# Patient Record
Sex: FEMALE | Race: WHITE | NOT HISPANIC OR LATINO | Employment: OTHER | URBAN - METROPOLITAN AREA
[De-identification: names, ages, dates, MRNs, and addresses within clinical notes are randomized per-mention and may not be internally consistent; named-entity substitution may affect disease eponyms.]

---

## 2022-02-28 ENCOUNTER — CONSULTATION/EVALUATION (OUTPATIENT)
Dept: URBAN - METROPOLITAN AREA CLINIC 35 | Facility: CLINIC | Age: 67
End: 2022-02-28

## 2022-02-28 DIAGNOSIS — H25.812: ICD-10-CM

## 2022-02-28 DIAGNOSIS — H25.811: ICD-10-CM

## 2022-02-28 PROCEDURE — 92004 COMPRE OPH EXAM NEW PT 1/>: CPT

## 2022-02-28 PROCEDURE — 92136TC INTERFEROMETRY - TECHNICAL COMPONENT

## 2022-02-28 PROCEDURE — 92025CV CORNEAL TOPOGRAPHY, CV

## 2022-02-28 PROCEDURE — V2799PMN IMPRIMIS PRED-MOXI-NEPAF 5ML

## 2022-02-28 ASSESSMENT — VISUAL ACUITY
OS_SC: J1
OS_CC: 20/25
OS_SC: 20/400
OD_SC: J2
OD_CC: 20/25
OD_SC: 20/200

## 2022-02-28 ASSESSMENT — TONOMETRY
OD_IOP_MMHG: 18
OS_IOP_MMHG: 20

## 2022-04-07 ENCOUNTER — POST-OP (OUTPATIENT)
Dept: URBAN - METROPOLITAN AREA CLINIC 39 | Facility: CLINIC | Age: 67
End: 2022-04-07

## 2022-04-07 ENCOUNTER — SURGERY/PROCEDURE (OUTPATIENT)
Dept: URBAN - METROPOLITAN AREA CLINIC 35 | Facility: CLINIC | Age: 67
End: 2022-04-07

## 2022-04-07 ENCOUNTER — PRE-OP/H&P (OUTPATIENT)
Dept: URBAN - METROPOLITAN AREA SURGERY 14 | Facility: SURGERY | Age: 67
End: 2022-04-07

## 2022-04-07 DIAGNOSIS — Z96.1: ICD-10-CM

## 2022-04-07 DIAGNOSIS — H25.811: ICD-10-CM

## 2022-04-07 DIAGNOSIS — H25.813: ICD-10-CM

## 2022-04-07 PROCEDURE — 99211T TECH SERVICE

## 2022-04-07 PROCEDURE — 6698454CV REMOVE CATARACT, INSERT LENS,SX ONLY, CUSTOM VISION

## 2022-04-07 PROCEDURE — 66999LNSR LENSAR LASER FOR CAT SX

## 2022-04-07 PROCEDURE — 65772LRI LRI DURING CAT SX

## 2022-04-07 ASSESSMENT — TONOMETRY: OS_IOP_MMHG: 15

## 2022-04-07 ASSESSMENT — VISUAL ACUITY: OS_SC: 20/70

## 2022-04-07 NOTE — PATIENT DISCUSSION
Patient advised of the right to post-operative care by the surgeon. Patient is fully informed of, and agreed to, co-management with their primary optometric physician. Post-operative care by the surgeon is not medically necessary and co-management is clinically appropriate. Patient has received itemization of fees related to cataract surgery. Transfer of care letter completed for the patient. Transfer care of left eye to Dr. Hunter Hughes on 4/7/2022. Patient instructed to call immediately if any new distortion, blurring, decreased vision or eye pain.

## 2022-04-14 ENCOUNTER — POST OP/EVAL OF SECOND EYE (OUTPATIENT)
Dept: URBAN - METROPOLITAN AREA CLINIC 39 | Facility: CLINIC | Age: 67
End: 2022-04-14

## 2022-04-14 ENCOUNTER — POST-OP (OUTPATIENT)
Dept: URBAN - METROPOLITAN AREA CLINIC 39 | Facility: CLINIC | Age: 67
End: 2022-04-14

## 2022-04-14 ENCOUNTER — SURGERY/PROCEDURE (OUTPATIENT)
Dept: URBAN - METROPOLITAN AREA CLINIC 35 | Facility: CLINIC | Age: 67
End: 2022-04-14

## 2022-04-14 DIAGNOSIS — H25.811: ICD-10-CM

## 2022-04-14 DIAGNOSIS — Z96.1: ICD-10-CM

## 2022-04-14 DIAGNOSIS — H52.13: ICD-10-CM

## 2022-04-14 PROCEDURE — 6698454CV REMOVE CATARACT, INSERT LENS,SX ONLY, CUSTOM VISION

## 2022-04-14 PROCEDURE — 65772LRI LRI DURING CAT SX

## 2022-04-14 PROCEDURE — 99212 OFFICE O/P EST SF 10 MIN: CPT

## 2022-04-14 PROCEDURE — 99024 POSTOP FOLLOW-UP VISIT: CPT

## 2022-04-14 PROCEDURE — 66999LNSR LENSAR LASER FOR CAT SX

## 2022-04-14 ASSESSMENT — VISUAL ACUITY
OD_BAT: 20/80 W/ MR
OS_CC: 20/25+1
OD_SC: 20/70-1
OS_SC: J10
OS_SC: 20/25-2

## 2022-04-14 ASSESSMENT — TONOMETRY
OD_IOP_MMHG: 16
OD_IOP_MMHG: 16
OS_IOP_MMHG: 18

## 2022-04-14 NOTE — PATIENT DISCUSSION
Patient advised of the right to post-operative care by the surgeon. Patient is fully informed of, and agreed to, co-management with their primary optometric physician. Post-operative care by the surgeon is not medically necessary and co-management is clinically appropriate. Patient has received itemization of fees related to cataract surgery. Transfer of care letter completed for the patient. Transfer care of right eye to Dr. Hunter Hughes on 04/14/22. Patient instructed to call immediately if any new distortion, blurring, decreased vision or eye pain.

## 2022-04-14 NOTE — PATIENT DISCUSSION
The types of intraocular lenses were reviewed with the patient along with a discussion of their various strengths and weaknesses. LISETH Christie.

## 2025-04-21 ENCOUNTER — CONSULTATION/EVALUATION (OUTPATIENT)
Age: 70
End: 2025-04-21

## 2025-04-21 ENCOUNTER — SURGERY/PROCEDURE (OUTPATIENT)
Age: 70
End: 2025-04-21

## 2025-04-21 DIAGNOSIS — H26.493: ICD-10-CM
